# Patient Record
Sex: MALE | Race: WHITE | NOT HISPANIC OR LATINO | Employment: OTHER | URBAN - METROPOLITAN AREA
[De-identification: names, ages, dates, MRNs, and addresses within clinical notes are randomized per-mention and may not be internally consistent; named-entity substitution may affect disease eponyms.]

---

## 2018-08-07 ENCOUNTER — APPOINTMENT (OUTPATIENT)
Dept: RADIOLOGY | Facility: MEDICAL CENTER | Age: 68
End: 2018-08-07
Attending: EMERGENCY MEDICINE
Payer: MEDICARE

## 2018-08-07 ENCOUNTER — HOSPITAL ENCOUNTER (OUTPATIENT)
Facility: MEDICAL CENTER | Age: 68
End: 2018-08-08
Attending: EMERGENCY MEDICINE | Admitting: HOSPITALIST
Payer: MEDICARE

## 2018-08-07 DIAGNOSIS — R50.9 FEVER, UNSPECIFIED FEVER CAUSE: ICD-10-CM

## 2018-08-07 DIAGNOSIS — R09.02 HYPOXIA: ICD-10-CM

## 2018-08-07 PROBLEM — I48.20 CHRONIC ATRIAL FIBRILLATION (HCC): Status: ACTIVE | Noted: 2018-08-07

## 2018-08-07 PROBLEM — I10 ESSENTIAL HYPERTENSION: Status: ACTIVE | Noted: 2018-08-07

## 2018-08-07 LAB
ALBUMIN SERPL BCP-MCNC: 3.9 G/DL (ref 3.2–4.9)
ALBUMIN/GLOB SERPL: 1.2 G/DL
ALP SERPL-CCNC: 41 U/L (ref 30–99)
ALT SERPL-CCNC: 9 U/L (ref 2–50)
ANION GAP SERPL CALC-SCNC: 8 MMOL/L (ref 0–11.9)
APPEARANCE UR: CLEAR
APTT PPP: 29 SEC (ref 24.7–36)
AST SERPL-CCNC: 12 U/L (ref 12–45)
BACTERIA #/AREA URNS HPF: NEGATIVE /HPF
BASOPHILS # BLD AUTO: 0.2 % (ref 0–1.8)
BASOPHILS # BLD: 0.02 K/UL (ref 0–0.12)
BILIRUB SERPL-MCNC: 0.7 MG/DL (ref 0.1–1.5)
BILIRUB UR QL STRIP.AUTO: NEGATIVE
BNP SERPL-MCNC: 130 PG/ML (ref 0–100)
BUN SERPL-MCNC: 14 MG/DL (ref 8–22)
CALCIUM SERPL-MCNC: 8.6 MG/DL (ref 8.5–10.5)
CHLORIDE SERPL-SCNC: 100 MMOL/L (ref 96–112)
CO2 SERPL-SCNC: 25 MMOL/L (ref 20–33)
COLOR UR: ABNORMAL
CREAT SERPL-MCNC: 1.09 MG/DL (ref 0.5–1.4)
DEPRECATED D DIMER PPP IA-ACNC: 408 NG/ML(D-DU)
EKG IMPRESSION: NORMAL
EOSINOPHIL # BLD AUTO: 0.01 K/UL (ref 0–0.51)
EOSINOPHIL NFR BLD: 0.1 % (ref 0–6.9)
EPI CELLS #/AREA URNS HPF: NEGATIVE /HPF
ERYTHROCYTE [DISTWIDTH] IN BLOOD BY AUTOMATED COUNT: 48.9 FL (ref 35.9–50)
FLUAV RNA SPEC QL NAA+PROBE: NEGATIVE
FLUBV RNA SPEC QL NAA+PROBE: NEGATIVE
GLOBULIN SER CALC-MCNC: 3.3 G/DL (ref 1.9–3.5)
GLUCOSE SERPL-MCNC: 128 MG/DL (ref 65–99)
GLUCOSE UR STRIP.AUTO-MCNC: NEGATIVE MG/DL
HCT VFR BLD AUTO: 37.8 % (ref 42–52)
HGB BLD-MCNC: 12.4 G/DL (ref 14–18)
HYALINE CASTS #/AREA URNS LPF: ABNORMAL /LPF
IMM GRANULOCYTES # BLD AUTO: 0.05 K/UL (ref 0–0.11)
IMM GRANULOCYTES NFR BLD AUTO: 0.5 % (ref 0–0.9)
INR PPP: 1.32 (ref 0.87–1.13)
KETONES UR STRIP.AUTO-MCNC: NEGATIVE MG/DL
LACTATE BLD-SCNC: 1 MMOL/L (ref 0.5–2)
LACTATE BLD-SCNC: 1.1 MMOL/L (ref 0.5–2)
LACTATE BLD-SCNC: 1.3 MMOL/L (ref 0.5–2)
LACTATE BLD-SCNC: 1.5 MMOL/L (ref 0.5–2)
LEUKOCYTE ESTERASE UR QL STRIP.AUTO: NEGATIVE
LYMPHOCYTES # BLD AUTO: 1.31 K/UL (ref 1–4.8)
LYMPHOCYTES NFR BLD: 13.8 % (ref 22–41)
MCH RBC QN AUTO: 31.2 PG (ref 27–33)
MCHC RBC AUTO-ENTMCNC: 32.8 G/DL (ref 33.7–35.3)
MCV RBC AUTO: 95 FL (ref 81.4–97.8)
MICRO URNS: ABNORMAL
MONOCYTES # BLD AUTO: 0.73 K/UL (ref 0–0.85)
MONOCYTES NFR BLD AUTO: 7.7 % (ref 0–13.4)
NEUTROPHILS # BLD AUTO: 7.39 K/UL (ref 1.82–7.42)
NEUTROPHILS NFR BLD: 77.7 % (ref 44–72)
NITRITE UR QL STRIP.AUTO: NEGATIVE
NRBC # BLD AUTO: 0 K/UL
NRBC BLD-RTO: 0 /100 WBC
PH UR STRIP.AUTO: 6 [PH]
PLATELET # BLD AUTO: 150 K/UL (ref 164–446)
PMV BLD AUTO: 9.9 FL (ref 9–12.9)
POTASSIUM SERPL-SCNC: 4 MMOL/L (ref 3.6–5.5)
PROT SERPL-MCNC: 7.2 G/DL (ref 6–8.2)
PROT UR QL STRIP: 30 MG/DL
PROTHROMBIN TIME: 16.1 SEC (ref 12–14.6)
RBC # BLD AUTO: 3.98 M/UL (ref 4.7–6.1)
RBC # URNS HPF: ABNORMAL /HPF
RBC UR QL AUTO: ABNORMAL
SODIUM SERPL-SCNC: 133 MMOL/L (ref 135–145)
SP GR UR STRIP.AUTO: 1.03
TROPONIN I SERPL-MCNC: 0.03 NG/ML (ref 0–0.04)
UROBILINOGEN UR STRIP.AUTO-MCNC: 2 MG/DL
WBC # BLD AUTO: 9.5 K/UL (ref 4.8–10.8)
WBC #/AREA URNS HPF: ABNORMAL /HPF

## 2018-08-07 PROCEDURE — 87502 INFLUENZA DNA AMP PROBE: CPT

## 2018-08-07 PROCEDURE — 700111 HCHG RX REV CODE 636 W/ 250 OVERRIDE (IP): Performed by: HOSPITALIST

## 2018-08-07 PROCEDURE — 85610 PROTHROMBIN TIME: CPT

## 2018-08-07 PROCEDURE — 71275 CT ANGIOGRAPHY CHEST: CPT

## 2018-08-07 PROCEDURE — 700105 HCHG RX REV CODE 258: Performed by: ORTHOPAEDIC SURGERY

## 2018-08-07 PROCEDURE — 99291 CRITICAL CARE FIRST HOUR: CPT

## 2018-08-07 PROCEDURE — 93005 ELECTROCARDIOGRAM TRACING: CPT | Performed by: EMERGENCY MEDICINE

## 2018-08-07 PROCEDURE — 87040 BLOOD CULTURE FOR BACTERIA: CPT | Mod: 91

## 2018-08-07 PROCEDURE — 94640 AIRWAY INHALATION TREATMENT: CPT

## 2018-08-07 PROCEDURE — 85379 FIBRIN DEGRADATION QUANT: CPT

## 2018-08-07 PROCEDURE — 83605 ASSAY OF LACTIC ACID: CPT | Mod: 91

## 2018-08-07 PROCEDURE — 700102 HCHG RX REV CODE 250 W/ 637 OVERRIDE(OP): Performed by: HOSPITALIST

## 2018-08-07 PROCEDURE — 700102 HCHG RX REV CODE 250 W/ 637 OVERRIDE(OP): Performed by: ANESTHESIOLOGY

## 2018-08-07 PROCEDURE — 80053 COMPREHEN METABOLIC PANEL: CPT

## 2018-08-07 PROCEDURE — 700105 HCHG RX REV CODE 258: Performed by: EMERGENCY MEDICINE

## 2018-08-07 PROCEDURE — G0378 HOSPITAL OBSERVATION PER HR: HCPCS

## 2018-08-07 PROCEDURE — 700102 HCHG RX REV CODE 250 W/ 637 OVERRIDE(OP)

## 2018-08-07 PROCEDURE — 83880 ASSAY OF NATRIURETIC PEPTIDE: CPT

## 2018-08-07 PROCEDURE — 700117 HCHG RX CONTRAST REV CODE 255: Performed by: EMERGENCY MEDICINE

## 2018-08-07 PROCEDURE — 85025 COMPLETE CBC W/AUTO DIFF WBC: CPT

## 2018-08-07 PROCEDURE — 302128 INFUSION PUMP W/POLE: Performed by: HOSPITALIST

## 2018-08-07 PROCEDURE — A9270 NON-COVERED ITEM OR SERVICE: HCPCS | Performed by: ANESTHESIOLOGY

## 2018-08-07 PROCEDURE — 71045 X-RAY EXAM CHEST 1 VIEW: CPT

## 2018-08-07 PROCEDURE — 99220 PR INITIAL OBSERVATION CARE,LEVL III: CPT | Mod: AI | Performed by: HOSPITALIST

## 2018-08-07 PROCEDURE — 700101 HCHG RX REV CODE 250: Performed by: EMERGENCY MEDICINE

## 2018-08-07 PROCEDURE — 36415 COLL VENOUS BLD VENIPUNCTURE: CPT

## 2018-08-07 PROCEDURE — A9270 NON-COVERED ITEM OR SERVICE: HCPCS

## 2018-08-07 PROCEDURE — 96368 THER/DIAG CONCURRENT INF: CPT

## 2018-08-07 PROCEDURE — A9270 NON-COVERED ITEM OR SERVICE: HCPCS | Performed by: HOSPITALIST

## 2018-08-07 PROCEDURE — 700111 HCHG RX REV CODE 636 W/ 250 OVERRIDE (IP): Performed by: EMERGENCY MEDICINE

## 2018-08-07 PROCEDURE — 96365 THER/PROPH/DIAG IV INF INIT: CPT | Mod: XU

## 2018-08-07 PROCEDURE — 700111 HCHG RX REV CODE 636 W/ 250 OVERRIDE (IP): Performed by: ORTHOPAEDIC SURGERY

## 2018-08-07 PROCEDURE — 81001 URINALYSIS AUTO W/SCOPE: CPT

## 2018-08-07 PROCEDURE — 84484 ASSAY OF TROPONIN QUANT: CPT

## 2018-08-07 PROCEDURE — 85730 THROMBOPLASTIN TIME PARTIAL: CPT

## 2018-08-07 PROCEDURE — 73501 X-RAY EXAM HIP UNI 1 VIEW: CPT | Mod: RT

## 2018-08-07 RX ORDER — PREDNISONE 20 MG/1
40 TABLET ORAL DAILY
Status: DISCONTINUED | OUTPATIENT
Start: 2018-08-07 | End: 2018-08-08 | Stop reason: HOSPADM

## 2018-08-07 RX ORDER — ACETAMINOPHEN 325 MG/1
TABLET ORAL
Status: DISCONTINUED
Start: 2018-08-07 | End: 2018-08-07

## 2018-08-07 RX ORDER — LABETALOL HYDROCHLORIDE 5 MG/ML
10 INJECTION, SOLUTION INTRAVENOUS EVERY 4 HOURS PRN
Status: DISCONTINUED | OUTPATIENT
Start: 2018-08-07 | End: 2018-08-08 | Stop reason: HOSPADM

## 2018-08-07 RX ORDER — CELECOXIB 200 MG/1
400 CAPSULE ORAL ONCE
Status: DISCONTINUED | OUTPATIENT
Start: 2018-08-29 | End: 2018-08-07

## 2018-08-07 RX ORDER — OXYCODONE HCL 20 MG/1
20 TABLET, FILM COATED, EXTENDED RELEASE ORAL
Status: DISCONTINUED | OUTPATIENT
Start: 2018-08-29 | End: 2018-08-07

## 2018-08-07 RX ORDER — FAMOTIDINE 20 MG/1
20 TABLET, FILM COATED ORAL
Status: DISCONTINUED | OUTPATIENT
Start: 2018-08-29 | End: 2018-08-07

## 2018-08-07 RX ORDER — CEFAZOLIN SODIUM 2 G/100ML
2 INJECTION, SOLUTION INTRAVENOUS
Status: DISCONTINUED | OUTPATIENT
Start: 2018-08-29 | End: 2018-08-07

## 2018-08-07 RX ORDER — GABAPENTIN 300 MG/1
300 CAPSULE ORAL ONCE
Status: DISCONTINUED | OUTPATIENT
Start: 2018-08-29 | End: 2018-08-07

## 2018-08-07 RX ORDER — ONDANSETRON 2 MG/ML
4 INJECTION INTRAMUSCULAR; INTRAVENOUS EVERY 4 HOURS PRN
Status: DISCONTINUED | OUTPATIENT
Start: 2018-08-07 | End: 2018-08-08 | Stop reason: HOSPADM

## 2018-08-07 RX ORDER — SIMVASTATIN 40 MG
40 TABLET ORAL NIGHTLY
Status: DISCONTINUED | OUTPATIENT
Start: 2018-08-07 | End: 2018-08-08 | Stop reason: HOSPADM

## 2018-08-07 RX ORDER — CARVEDILOL 12.5 MG/1
12.5 TABLET ORAL 2 TIMES DAILY
Status: DISCONTINUED | OUTPATIENT
Start: 2018-08-07 | End: 2018-08-08 | Stop reason: HOSPADM

## 2018-08-07 RX ORDER — ONDANSETRON 4 MG/1
4 TABLET, ORALLY DISINTEGRATING ORAL EVERY 4 HOURS PRN
Status: DISCONTINUED | OUTPATIENT
Start: 2018-08-07 | End: 2018-08-08 | Stop reason: HOSPADM

## 2018-08-07 RX ORDER — AZITHROMYCIN 500 MG/1
500 INJECTION, POWDER, LYOPHILIZED, FOR SOLUTION INTRAVENOUS ONCE
Status: COMPLETED | OUTPATIENT
Start: 2018-08-07 | End: 2018-08-07

## 2018-08-07 RX ORDER — AMOXICILLIN 250 MG
CAPSULE ORAL
Status: COMPLETED
Start: 2018-08-07 | End: 2018-08-07

## 2018-08-07 RX ORDER — AMOXICILLIN 500 MG/1
1000 CAPSULE ORAL ONCE
Status: ON HOLD | COMMUNITY
End: 2018-08-08

## 2018-08-07 RX ORDER — LISINOPRIL 2.5 MG/1
2.5 TABLET ORAL EVERY MORNING
Status: DISCONTINUED | OUTPATIENT
Start: 2018-08-07 | End: 2018-08-08 | Stop reason: HOSPADM

## 2018-08-07 RX ORDER — ALBUTEROL SULFATE 90 UG/1
2 AEROSOL, METERED RESPIRATORY (INHALATION) EVERY 4 HOURS PRN
Status: DISCONTINUED | OUTPATIENT
Start: 2018-08-07 | End: 2018-08-08 | Stop reason: HOSPADM

## 2018-08-07 RX ORDER — POLYETHYLENE GLYCOL 3350 17 G/17G
1 POWDER, FOR SOLUTION ORAL
Status: DISCONTINUED | OUTPATIENT
Start: 2018-08-07 | End: 2018-08-08 | Stop reason: HOSPADM

## 2018-08-07 RX ORDER — ACETAMINOPHEN 500 MG
1000 TABLET ORAL ONCE
Status: DISCONTINUED | OUTPATIENT
Start: 2018-08-29 | End: 2018-08-07

## 2018-08-07 RX ORDER — BISACODYL 10 MG
10 SUPPOSITORY, RECTAL RECTAL
Status: DISCONTINUED | OUTPATIENT
Start: 2018-08-07 | End: 2018-08-08 | Stop reason: HOSPADM

## 2018-08-07 RX ORDER — ACETAMINOPHEN 325 MG/1
650 TABLET ORAL EVERY 6 HOURS PRN
Status: DISCONTINUED | OUTPATIENT
Start: 2018-08-07 | End: 2018-08-08 | Stop reason: HOSPADM

## 2018-08-07 RX ORDER — IPRATROPIUM BROMIDE AND ALBUTEROL SULFATE 2.5; .5 MG/3ML; MG/3ML
3 SOLUTION RESPIRATORY (INHALATION)
Status: COMPLETED | OUTPATIENT
Start: 2018-08-07 | End: 2018-08-07

## 2018-08-07 RX ORDER — AMOXICILLIN 250 MG
2 CAPSULE ORAL 2 TIMES DAILY
Status: DISCONTINUED | OUTPATIENT
Start: 2018-08-07 | End: 2018-08-08 | Stop reason: HOSPADM

## 2018-08-07 RX ADMIN — SIMVASTATIN 40 MG: 40 TABLET, FILM COATED ORAL at 20:50

## 2018-08-07 RX ADMIN — SODIUM CHLORIDE 3 G: 900 INJECTION INTRAVENOUS at 07:30

## 2018-08-07 RX ADMIN — PREDNISONE 40 MG: 20 TABLET ORAL at 12:05

## 2018-08-07 RX ADMIN — IOHEXOL 100 ML: 350 INJECTION, SOLUTION INTRAVENOUS at 09:40

## 2018-08-07 RX ADMIN — CARVEDILOL 12.5 MG: 12.5 TABLET, FILM COATED ORAL at 18:31

## 2018-08-07 RX ADMIN — APIXABAN 5 MG: 5 TABLET, FILM COATED ORAL at 18:31

## 2018-08-07 RX ADMIN — ACETAMINOPHEN 650 MG: 325 TABLET ORAL at 16:24

## 2018-08-07 RX ADMIN — AZITHROMYCIN FOR INJECTION INJECTION, POWDER, LYOPHILIZED, FOR SOLUTION 500 MG: 500 INJECTION INTRAVENOUS at 07:30

## 2018-08-07 RX ADMIN — Medication 1 TABLET: at 20:49

## 2018-08-07 RX ADMIN — IPRATROPIUM BROMIDE AND ALBUTEROL SULFATE 3 ML: .5; 3 SOLUTION RESPIRATORY (INHALATION) at 10:24

## 2018-08-07 RX ADMIN — STANDARDIZED SENNA CONCENTRATE AND DOCUSATE SODIUM 1 TABLET: 8.6; 5 TABLET, FILM COATED ORAL at 20:49

## 2018-08-07 ASSESSMENT — COPD QUESTIONNAIRES
DURING THE PAST 4 WEEKS HOW MUCH DID YOU FEEL SHORT OF BREATH: SOME OF THE TIME
DURING THE PAST 4 WEEKS HOW MUCH DID YOU FEEL SHORT OF BREATH: NONE/LITTLE OF THE TIME
DO YOU EVER COUGH UP ANY MUCUS OR PHLEGM?: NO/ONLY WITH OCCASIONAL COLDS OR INFECTIONS
IN THE PAST 12 MONTHS DO YOU DO LESS THAN YOU USED TO BECAUSE OF YOUR BREATHING PROBLEMS: DISAGREE/UNSURE
DO YOU EVER COUGH UP ANY MUCUS OR PHLEGM?: NO/ONLY WITH OCCASIONAL COLDS OR INFECTIONS
HAVE YOU SMOKED AT LEAST 100 CIGARETTES IN YOUR ENTIRE LIFE: NO/DON'T KNOW
COPD SCREENING SCORE: 5
HAVE YOU SMOKED AT LEAST 100 CIGARETTES IN YOUR ENTIRE LIFE: YES
COPD SCREENING SCORE: 2

## 2018-08-07 ASSESSMENT — ENCOUNTER SYMPTOMS
DIZZINESS: 0
MYALGIAS: 0
CHILLS: 1
CONSTIPATION: 0
COUGH: 1
DIARRHEA: 0
HEADACHES: 0
FOCAL WEAKNESS: 0
SHORTNESS OF BREATH: 1
NAUSEA: 0
PALPITATIONS: 0
WEAKNESS: 0
FEVER: 0
VOMITING: 0
ABDOMINAL PAIN: 0

## 2018-08-07 ASSESSMENT — PATIENT HEALTH QUESTIONNAIRE - PHQ9
1. LITTLE INTEREST OR PLEASURE IN DOING THINGS: NOT AT ALL
2. FEELING DOWN, DEPRESSED, IRRITABLE, OR HOPELESS: NOT AT ALL
SUM OF ALL RESPONSES TO PHQ9 QUESTIONS 1 AND 2: 0

## 2018-08-07 ASSESSMENT — LIFESTYLE VARIABLES
EVER_SMOKED: YES
EVER_SMOKED: YES
ALCOHOL_USE: NO

## 2018-08-07 ASSESSMENT — PAIN SCALES - GENERAL
PAINLEVEL_OUTOF10: 4
PAINLEVEL_OUTOF10: 7
PAINLEVEL_OUTOF10: 5
PAINLEVEL_OUTOF10: 3
PAINLEVEL_OUTOF10: 7

## 2018-08-07 NOTE — ED NOTES
Updated patient on plan of care, aware we are still waiting for bed. Patient resting in bed. Side rails up. Call light in reach. No needs expressed. No questions. Vital signs stable. Wife at bedside.

## 2018-08-07 NOTE — ED TRIAGE NOTES
"Jaspreet SYKES Dobler  68 y.o. Male    Chief Complaint   Patient presents with   • T-5000 GLF     Pt reports he fell this morning at home. Denies LOC. States he tripped.    • Fever     Pt reports having a 103f fever at home, EMS reports 100f   • Shortness of Breath     Pt has felt SOB upon exertion for the past 2 weeks     Pt BIB EMS for above CC.   EMS placed PIV and administered 400cc NS during ride.   Upon arrival to ER. Pt is alert and oriented, speaking in full sentences, follows commands and responds appropriately to questions. NAD. Resp are meera and unlabored.     Hx: CHF    Blood Pressure : 119/69, Pulse: 81, Respiration: 16, Temperature: 37.6 °C (99.7 °F), Height: 193 cm (6' 4\"), Weight: 98.9 kg (218 lb), BMI (Calculated): 26.54, BSA (Calculated): 2.3, Pulse Oximetry: 94 %, O2 (LPM): 2, O2 Delivery: Nasal Cannula    "

## 2018-08-07 NOTE — H&P
"Hospital Medicine History & Physical Note    Date of Service  8/7/2018    Primary Care Physician  Anthony Cason M.D.    Consultants  None    Code Status  Full    Chief Complaint  \"I can't shake this cough\"    History of Presenting Illness  68 y.o. male who presented 8/7/2018 with shortness of breath and cough after a cold that started on the flight back from a three week trip to [place]. He did not have any animal contact or any farmland visits. He was not hiking. He had no sick contacts. He has atrial fibrillation, which is rate controlled, and he is on Eliquis. He feels like he has some shaking chills but has not taken his temperature. His cough is not really improved per the patient.      Review of Systems  Review of Systems   Constitutional: Positive for chills. Negative for fever.   Respiratory: Positive for cough and shortness of breath.    Cardiovascular: Negative for chest pain and palpitations.   Gastrointestinal: Negative for abdominal pain, constipation, diarrhea, nausea and vomiting.   Genitourinary: Negative for dysuria.   Musculoskeletal: Negative for myalgias.   Skin: Negative for itching.   Neurological: Negative for dizziness, focal weakness, weakness and headaches.   All other systems reviewed and are negative.      Past Medical History   has a past medical history of Acute pain; Allergy; Arthritis; Asthma; Cancer (HCC) (y-12); Chronic atrial fibrillation (HCC) (8/7/2018); Congestive heart failure (HCC); Essential hypertension (8/7/2018); High cholesterol; Hyperlipidemia; and Osteoporosis. He also has no past medical history of Anesthesia; Anginal syndrome ([facility]); At risk for sleep apnea; Blood clotting disorder (HCC); Bowel habit changes; Breath shortness; Bronchitis; CAPD (continuous ambulatory peritoneal dialysis) status; Carcinoma in situ of respiratory system; Cataract; Cold; Coughing blood; Dental disorder; Diabetes (HCC); Disorder of thyroid; Emphysema of lung (HCC); Glaucoma; Heart " burn; Heart murmur; Heart valve disease; Hemorrhagic disorder (HCC); Hepatitis A; Hepatitis B; Hepatitis C; Indigestion; Myocardial infarct (HCC); Pacemaker; Pneumonia; Psychiatric problem; Renal disorder; Rheumatic fever; Seizure (HCC); Sleep apnea; Snoring; Stroke (HCC); Tuberculosis; Urinary bladder disorder; or Urinary incontinence.    Surgical History   has a past surgical history that includes hip arthroplasty total; knee arthroplasty total (Left); other abdominal surgery; and hip arth anterior total (Left, 8/10/2016).     Family History  family history includes Cancer in his father, sister, and sister; GI in his sister; Seizures in his sister.     Social History   reports that he quit smoking about 28 years ago. He has never used smokeless tobacco. He reports that he does not drink alcohol or use drugs. He smoked for 20 years.    Allergies  No Known Allergies    Medications  Prior to Admission Medications   Prescriptions Last Dose Informant Patient Reported? Taking?   acetaminophen (TYLENOL) 500 MG Tab 8/7/2018 at 0400 Patient Yes No   Sig: Take 500 mg by mouth 4 times a day as needed.   amoxicillin (AMOXIL) 500 MG Cap > 2 weeks at ONCE Patient's Home Pharmacy Yes Yes   Sig: Take 1,000 mg by mouth Once. Pt completed a one time dose of AMOXIL > 2 weeks ago for dental procedure.   apixaban (ELIQUIS) 5mg Tab 8/7/2018 at 0400 Patient Yes No   Sig: Take 5 mg by mouth 2 Times a Day.   carvedilol (COREG) 12.5 MG Tab 8/7/2018 at 0400 Patient Yes No   Sig: Take 12.5 mg by mouth 2 Times a Day.   lisinopril (PRINIVIL) 5 MG Tab 8/7/2018 at 0400 Patient Yes No   Sig: Take 2.5 mg by mouth every morning.   sennosides (SENOKOT) 8.6 MG Tab > 3 days at PRN Patient Yes No   Sig: Take 8.6 mg by mouth 1 time daily as needed.   simvastatin (ZOCOR) 40 MG Tab 8/6/2018 at 2000 Patient Yes No   Sig: Take 40 mg by mouth every evening.   zolpidem (AMBIEN) 5 MG Tab 8/6/2018 at 2000 Patient Yes No   Sig: Take 5 mg by mouth at bedtime as  needed for Sleep.      Facility-Administered Medications: None       Physical Exam  Blood Pressure : 119/69   Temperature: 37.6 °C (99.7 °F)   Pulse: 81   Respiration: 16   Pulse Oximetry: 97 %     Physical Exam   Constitutional: He is oriented to person, place, and time. He appears well-developed and well-nourished.   HENT:   Head: Normocephalic and atraumatic.   Mouth/Throat: Oropharynx is clear and moist.   Eyes: Pupils are equal, round, and reactive to light. Conjunctivae and EOM are normal. No scleral icterus.   Neck: Normal range of motion. Neck supple. No tracheal deviation present. No thyromegaly present.   Cardiovascular: Normal rate, regular rhythm, normal heart sounds and intact distal pulses.    No murmur heard.  Pulmonary/Chest: Effort normal and breath sounds normal. No respiratory distress. He has no wheezes. He has no rales. He exhibits no tenderness.   Abdominal: Soft. Bowel sounds are normal. He exhibits no distension. There is no tenderness.   Musculoskeletal: Normal range of motion. He exhibits no edema or tenderness.   Lymphadenopathy:     He has no cervical adenopathy.        Right: No supraclavicular adenopathy present.        Left: No supraclavicular adenopathy present.   Neurological: He is alert and oriented to person, place, and time. No cranial nerve deficit.   Skin: Skin is warm and dry.   Vitals reviewed.      Laboratory:  Recent Labs      08/07/18 0710   WBC  9.5   RBC  3.98*   HEMOGLOBIN  12.4*   HEMATOCRIT  37.8*   MCV  95.0   MCH  31.2   MCHC  32.8*   RDW  48.9   PLATELETCT  150*   MPV  9.9     Recent Labs      08/07/18   0710   SODIUM  133*   POTASSIUM  4.0   CHLORIDE  100   CO2  25   GLUCOSE  128*   BUN  14   CREATININE  1.09   CALCIUM  8.6     Recent Labs      08/07/18   0710   ALTSGPT  9   ASTSGOT  12   ALKPHOSPHAT  41   TBILIRUBIN  0.7   GLUCOSE  128*     Recent Labs      08/07/18 0710   APTT  29.0   INR  1.32*     Recent Labs      08/07/18 0710   BNPBTYPENAT  130*          Lab Results   Component Value Date    TROPONINI 0.03 08/07/2018       Urinalysis:    Recent Labs      08/07/18   0832   SPECGRAVITY  1.026   GLUCOSEUR  Negative   KETONES  Negative   NITRITE  Negative   LEUKESTERAS  Negative   WBCURINE  0-2*   RBCURINE  5-10*   BACTERIA  Negative   EPITHELCELL  Negative        Imaging:  DX-HIP-UNILATERAL-WITH PELVIS-1 VIEW RIGHT   Final Result      Postsurgical changes of the bilateral hips without definite acute osseous abnormality or evidence of hardware failure or complication.      CT-CTA CHEST PULMONARY ARTERY W/ RECONS   Final Result      1.  No evidence of pulmonary embolism.   2.  Basilar dependent atelectasis or scarring.   3.  Cholelithiasis.            DX-CHEST-PORTABLE (1 VIEW)   Final Result      Mild lung base atelectasis. Mild interstitial edema or pneumonitis not excluded.      I reviewed the images myself and agree with the radiologist's findings.      Assessment/Plan:  I anticipate this patient is appropriate for observation status at this time.    * Hypoxemia- (present on admission)   Assessment & Plan    I suspect this is due to reactive airway disease after a recent cold  He is hypoxemic still  I will start him on steroids for five days and RT while in the hospital  He should stick around overnight until his oxygen saturations are in the safe range and he can exert himself without dropping  PE is ruled out  No pneumonia on exam or imaging  Doubt alternatives like pulm artery hypertension but can't exclude it--if not improved can get echo        Chronic atrial fibrillation (HCC)- (present on admission)   Assessment & Plan    Will continue BB and eliquis  Rate controlled  No acute intervention        Essential hypertension- (present on admission)   Assessment & Plan    SBP goal less than 140 mmHg  DBP goal less than 90 mmHg  PRN and antihypertensives to titrate by hospitalist towards goal  Most recent Blood Pressure : 119/69             VTE prophylaxis: his home  eliquis

## 2018-08-07 NOTE — ED NOTES
Provided snack box, juice and milk at patient request. MD states patient okay to eat/drink. Updated patient on plan of care. Patient resting in bed. Side rails up. Call light in reach. No needs expressed. No questions. Vital signs stable.

## 2018-08-07 NOTE — ASSESSMENT & PLAN NOTE
- most likely due to reactive airway disease from recent viral URI  - was started on O2 supplementation and weaned to RA currently  - cont nebs tx per RT protocol and PO steroids to complete 5d course  - no clinical or radiographic e/o PNA noted; holding abx  - cont monitoring

## 2018-08-07 NOTE — FLOWSHEET NOTE
08/07/18 1026   Events/Summary/Plan   Events/Summary/Plan SVN given   Interdisciplinary Plan of Care-Goals (Indications)   Bronchodilator Indications Strong Subjective / Objective Improvement   Interdisciplinary Plan of Care-Outcomes    Bronchodilator Outcome Improved Vital Signs and Measures of Gas Exchange   Education   Education Yes - Pt. / Family has been Instructed in use of Respiratory Equipment;Yes - Pt. / Family has been Instructed in use of Respiratory Medications and Adverse Reactions   SVN Group   #SVN Performed Yes   Given By: Mouthpiece   Date SVN Last Changed 08/07/18   Date SVN Next Change Due (Q 7 Days) 08/14/18   Respiratory WDL   Respiratory (WDL) X   Chest Exam   Work Of Breathing / Effort Mild   Respiration 16   Pulse 75   Heart Rate (Monitored) 75   Breath Sounds   Pre/Post Intervention Pre Intervention Assessment   RUL Breath Sounds Diminished   RML Breath Sounds Diminished   RLL Breath Sounds Diminished   MARK Breath Sounds Diminished   LLL Breath Sounds Diminished   Oximetry   Continuous Oximetry Yes   Oxygen   Pulse Oximetry 90 %   O2 (LPM) 2.5   O2 Daily Delivery Respiratory  Silicone Nasal Cannula

## 2018-08-07 NOTE — ED NOTES
Patient prompted for urine sample at this time. Urinal in reach. Updated patient on plan of care. Patient resting in bed. Side rails up. Call light in reach. No needs expressed. No questions. Vital signs stable. Wife at bedside

## 2018-08-07 NOTE — PROGRESS NOTES
Received bedside report from ER RN. Assumed care of patient. Pt assessed and stable. VSS. Patient reports 5/10 pain at this time.  Administered medication for pain.  Discussed plan of care for day with patient and received verbal understanding. Call light within reach, bed in low position.  Educated pt re fall precautions and received verbal understanding.  However, pt continues to refuse bed alarms.  Pt is alert, oriented, and calls appropriately. Pt's temporal temperature was 100.5F on arrival.  Administered Tylenol.

## 2018-08-07 NOTE — ED NOTES
PIV established. Blood cultures x2 and rainbow of labs collected and tubed to lab.   Report to Wisam SALAS.

## 2018-08-07 NOTE — ED NOTES
"Patient becoming agitated, states \"we need to get the show on the road, we have stuff to do today.\" RN notified patient he is next for CT scan. Verbalized understanding. RN Spoke with MD, states he will speak with patient after CT.   "

## 2018-08-07 NOTE — ED NOTES
Patient resting in bed. Side rails up. Call light in reach. No needs expressed. No questions. Vital signs stable.

## 2018-08-07 NOTE — ED NOTES
Med Rec complete per Pt at bedside and Noricristinos @ [phone]  Allergies Reviewed    Pt completed a one time dose of AMOXIL > 2 weeks ago for dental procedure.

## 2018-08-07 NOTE — ED NOTES
Inpatient hospital bed provided. Updated patient on plan of care. Patient resting in bed. Side rails up. Call light in reach. No needs expressed. No questions. Vital signs stable. Patient appears to be in pain due to fall onto left hip, limping with ambulation. RN offered pain medication, patient declined.

## 2018-08-07 NOTE — ED PROVIDER NOTES
ED Provider Note    CHIEF COMPLAINT  Chief Complaint   Patient presents with   • T-5000 GLF     Pt reports he fell this morning at home. Denies LOC. States he tripped.    • Fever     Pt reports having a 103f fever at home, EMS reports 100f   • Shortness of Breath     Pt has felt SOB upon exertion for the past 2 weeks       HPI  Jaspreet Moon is a 68 y.o. male who presents with a chief complaint of shortness of breath.  Patient reports he has been short of breath with exertion for the last 2 weeks since getting home from a trip to [place].  He has had a cough which is moist but nonproductive.  He has felt hot, but developed fever up to 103 at home over the last 2 days.  He is felt very weak with this and this morning he slumped down to the ground because of his weakness.  He denies head injury or trauma related to this fall.  He has not had any chest pain or pleuritic pain.  Denies leg swelling or leg pain.  No rash.  Denies dysuria hematuria frequency urgency.  He has not had any orthopnea or PND.    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;  Service:    • HIP ARTHROPLASTY TOTAL      r hip   • KNEE ARTHROPLASTY TOTAL Left    • OTHER ABDOMINAL SURGERY      colon resection       CURRENT MEDICATIONS  Home Medications      "Reviewed by Elías Newman R.N. (Registered Nurse) on 08/07/18 at 0656  Med List Status: Partial   Medication Last Dose Status   acetaminophen (TYLENOL) 500 MG Tab 8/9/2016 Active   apixaban (ELIQUIS) 5mg Tab  Active   carvedilol (COREG) 12.5 MG Tab  Active   docusate sodium 100 MG Cap  Active   lisinopril (PRINIVIL) 5 MG Tab  Active   sennosides (SENOKOT) 8.6 MG Tab 8/4/2016 Active   simvastatin (ZOCOR) 40 MG Tab 8/9/2016 Active   zolpidem (AMBIEN) 5 MG Tab 8/9/2016 Active                ALLERGIES  No Known Allergies    PHYSICAL EXAM  VITAL SIGNS: /69   Pulse 73   Temp 37.6 °C (99.7 °F)   Resp 16   Ht 1.93 m (6' 4\")   Wt 98.9 kg (218 lb)   SpO2 94%   BMI 26.54 kg/m²    Constitutional: Awake and alert.  Somewhat breathless when speaking  HENT:  Atraumatic, Normocephalic.Oropharynx mildly mucus membranes, Nose normal inspection.   Eyes: Normal inspection  Neck: Supple  Cardiovascular: Normal heart rate, Normal rhythm.  Symmetric peripheral pulses.   Thorax & Lungs: No respiratory distress, No wheezing, No rales, No rhonchi, No chest tenderness.   Abdomen: Bowel sounds normal, soft, non-distended, nontender, no mass  Skin: Warm, Dry, No rash.   Back: No tenderness, No CVA tenderness.   Extremities: Audible venous varicosities.  No asymmetric swelling.   Neurologic: Grossly normal   Psychiatric: Anxious appearing    RADIOLOGY/PROCEDURES  DX-HIP-UNILATERAL-WITH PELVIS-1 VIEW RIGHT   Final Result      Postsurgical changes of the bilateral hips without definite acute osseous abnormality or evidence of hardware failure or complication.      CT-CTA CHEST PULMONARY ARTERY W/ RECONS   Final Result      1.  No evidence of pulmonary embolism.   2.  Basilar dependent atelectasis or scarring.   3.  Cholelithiasis.            DX-CHEST-PORTABLE (1 VIEW)   Final Result      Mild lung base atelectasis. Mild interstitial edema or pneumonitis not excluded.        Imaging is interpreted by radiologist    Labs:  Results " for orders placed or performed during the hospital encounter of 08/07/18   CBC w/ Differential   Result Value Ref Range    WBC 9.5 4.8 - 10.8 K/uL    RBC 3.98 (L) 4.70 - 6.10 M/uL    Hemoglobin 12.4 (L) 14.0 - 18.0 g/dL    Hematocrit 37.8 (L) 42.0 - 52.0 %    MCV 95.0 81.4 - 97.8 fL    MCH 31.2 27.0 - 33.0 pg    MCHC 32.8 (L) 33.7 - 35.3 g/dL    RDW 48.9 35.9 - 50.0 fL    Platelet Count 150 (L) 164 - 446 K/uL    MPV 9.9 9.0 - 12.9 fL    Neutrophils-Polys 77.70 (H) 44.00 - 72.00 %    Lymphocytes 13.80 (L) 22.00 - 41.00 %    Monocytes 7.70 0.00 - 13.40 %    Eosinophils 0.10 0.00 - 6.90 %    Basophils 0.20 0.00 - 1.80 %    Immature Granulocytes 0.50 0.00 - 0.90 %    Nucleated RBC 0.00 /100 WBC    Neutrophils (Absolute) 7.39 1.82 - 7.42 K/uL    Lymphs (Absolute) 1.31 1.00 - 4.80 K/uL    Monos (Absolute) 0.73 0.00 - 0.85 K/uL    Eos (Absolute) 0.01 0.00 - 0.51 K/uL    Baso (Absolute) 0.02 0.00 - 0.12 K/uL    Immature Granulocytes (abs) 0.05 0.00 - 0.11 K/uL    NRBC (Absolute) 0.00 K/uL   Complete Metabolic Panel (CMP)   Result Value Ref Range    Sodium 133 (L) 135 - 145 mmol/L    Potassium 4.0 3.6 - 5.5 mmol/L    Chloride 100 96 - 112 mmol/L    Co2 25 20 - 33 mmol/L    Anion Gap 8.0 0.0 - 11.9    Glucose 128 (H) 65 - 99 mg/dL    Bun 14 8 - 22 mg/dL    Creatinine 1.09 0.50 - 1.40 mg/dL    Calcium 8.6 8.5 - 10.5 mg/dL    AST(SGOT) 12 12 - 45 U/L    ALT(SGPT) 9 2 - 50 U/L    Alkaline Phosphatase 41 30 - 99 U/L    Total Bilirubin 0.7 0.1 - 1.5 mg/dL    Albumin 3.9 3.2 - 4.9 g/dL    Total Protein 7.2 6.0 - 8.2 g/dL    Globulin 3.3 1.9 - 3.5 g/dL    A-G Ratio 1.2 g/dL   Btype Natriuretic Peptide   Result Value Ref Range    B Natriuretic Peptide 130 (H) 0 - 100 pg/mL   Troponin STAT   Result Value Ref Range    Troponin I 0.03 0.00 - 0.04 ng/mL   LACTIC ACID   Result Value Ref Range    Lactic Acid 1.3 0.5 - 2.0 mmol/L   LACTIC ACID   Result Value Ref Range    Lactic Acid 1.0 0.5 - 2.0 mmol/L   PT/INR   Result Value Ref Range     PT 16.1 (H) 12.0 - 14.6 sec    INR 1.32 (H) 0.87 - 1.13   APTT   Result Value Ref Range    APTT 29.0 24.7 - 36.0 sec   D-Dimer (only helpful in low pre-test probability wells critieria. Do not order if patient ruled out by PERC criteria. See Weblinks at top of Labs section)   Result Value Ref Range    D-Dimer Screen 408 (H) <250 ng/mL(D-DU)   Influenza By PCR, A/B   Result Value Ref Range    Influenza virus A RNA Negative Negative    Influenza virus B, PCR Negative Negative   URINALYSIS,CULTURE IF INDICATED   Result Value Ref Range    Color DK Yellow     Character Clear     Specific Gravity 1.026 <1.035    Ph 6.0 5.0 - 8.0    Glucose Negative Negative mg/dL    Ketones Negative Negative mg/dL    Protein 30 (A) Negative mg/dL    Bilirubin Negative Negative    Urobilinogen, Urine 2.0 Negative    Nitrite Negative Negative    Leukocyte Esterase Negative Negative    Occult Blood Trace (A) Negative    Micro Urine Req Microscopic    ESTIMATED GFR   Result Value Ref Range    GFR If African American >60 >60 mL/min/1.73 m 2    GFR If Non African American >60 >60 mL/min/1.73 m 2   URINE MICROSCOPIC (W/UA)   Result Value Ref Range    WBC 0-2 (A) /hpf    RBC 5-10 (A) /hpf    Bacteria Negative None /hpf    Epithelial Cells Negative /hpf    Hyaline Cast 0-2 /lpf   EKG (ER)   Result Value Ref Range    Report       [facility] Emergency Dept.    Test Date:  2018  Pt Name:    MARIELOS BAEZ              Department: ER  MRN:        [id number]                      Room:        16  Gender:     Male                         Technician: [id number]  :        [id number]                   Requested By:LILO GONGORA  Order #:    [id number]                    Reading MD:    Measurements  Intervals                                Axis  Rate:       81                           P:          63  MI:         156                          QRS:        74  QRSD:       94                           T:          45  QT:          384  QTc:        446    Interpretive Statements  SINUS RHYTHM  PROBABLE LEFT ATRIAL ABNORMALITY  No previous ECG available for comparison          Medications   ampicillin/sulbactam (UNASYN) 3 g in  mL IVPB (0 g Intravenous Stopped 8/7/18 0800)   azithromycin (ZITHROMAX) injection 500 mg (500 mg Intravenous Given 8/7/18 0730)     COURSE & MEDICAL DECISION MAKING  Patient presents with dyspnea.  He is hypoxic on room air.  He did not have overt evidence of congestive heart failure or obvious findings on his exam.  He describes a recent trip overseas.  PE is within the differential.  Reports he has had fever and pneumonia is a possibility.  He is treated empirically for pneumonia with Unasyn and azithromycin.  Workup was undertaken.  His laboratory data returned relatively unremarkable.  Chest x-ray was clear.  He did have an elevated d-dimer and CT pulmonary angiogram was obtained which was unremarkable.  The source of his fevers was not identified.  He has had fairly recent dental work.  He could have endocarditis or other occult bacterial illness.  He remains hypoxic when he is taken off supplemental nasal cannula oxygen.  A trial of albuterol was ordered.  He will need to be admitted to the hospital for further workup and rule out bacteremia/endocarditis.  I consulted Dr. Peng for admission.    FINAL IMPRESSION  1.  Fever  2.  Hypoxia      This dictation was created using voice recognition software. The accuracy of the dictation is limited to the abilities of the software.  The nursing notes were reviewed and certain aspects of this information were incorporated into this note.      Electronically signed by: Wisam Baron, 8/7/2018 9:35 AM

## 2018-08-07 NOTE — ED NOTES
Attempted room air 02 sat, Patient dropped to mid to upper 80s (85-89), MD aware and at bedside to update patient. Placed back on 2L NC

## 2018-08-08 VITALS
SYSTOLIC BLOOD PRESSURE: 105 MMHG | TEMPERATURE: 98.8 F | HEIGHT: 76 IN | WEIGHT: 218 LBS | HEART RATE: 84 BPM | DIASTOLIC BLOOD PRESSURE: 68 MMHG | OXYGEN SATURATION: 91 % | BODY MASS INDEX: 26.55 KG/M2 | RESPIRATION RATE: 18 BRPM

## 2018-08-08 PROBLEM — J96.01 ACUTE RESPIRATORY FAILURE WITH HYPOXIA (HCC): Status: ACTIVE | Noted: 2018-08-07

## 2018-08-08 PROBLEM — J45.909 REACTIVE AIRWAY DISEASE: Status: ACTIVE | Noted: 2018-08-08

## 2018-08-08 PROCEDURE — A9270 NON-COVERED ITEM OR SERVICE: HCPCS | Performed by: INTERNAL MEDICINE

## 2018-08-08 PROCEDURE — 700102 HCHG RX REV CODE 250 W/ 637 OVERRIDE(OP): Performed by: HOSPITALIST

## 2018-08-08 PROCEDURE — 700102 HCHG RX REV CODE 250 W/ 637 OVERRIDE(OP): Performed by: INTERNAL MEDICINE

## 2018-08-08 PROCEDURE — A9270 NON-COVERED ITEM OR SERVICE: HCPCS | Performed by: HOSPITALIST

## 2018-08-08 PROCEDURE — A9270 NON-COVERED ITEM OR SERVICE: HCPCS

## 2018-08-08 PROCEDURE — 700102 HCHG RX REV CODE 250 W/ 637 OVERRIDE(OP)

## 2018-08-08 PROCEDURE — 700111 HCHG RX REV CODE 636 W/ 250 OVERRIDE (IP): Performed by: HOSPITALIST

## 2018-08-08 PROCEDURE — 99217 PR OBSERVATION CARE DISCHARGE: CPT | Performed by: INTERNAL MEDICINE

## 2018-08-08 RX ORDER — ALBUTEROL SULFATE 90 UG/1
2 AEROSOL, METERED RESPIRATORY (INHALATION) EVERY 4 HOURS PRN
Qty: 8.5 G | Refills: 0 | Status: SHIPPED | OUTPATIENT
Start: 2018-08-08

## 2018-08-08 RX ORDER — DOXYCYCLINE 100 MG/1
100 CAPSULE ORAL 2 TIMES DAILY
Qty: 10 CAP | Refills: 0 | Status: SHIPPED | OUTPATIENT
Start: 2018-08-08 | End: 2018-08-13

## 2018-08-08 RX ORDER — GUAIFENESIN 600 MG/1
600 TABLET, EXTENDED RELEASE ORAL EVERY 12 HOURS
Qty: 10 TAB | Refills: 0 | Status: SHIPPED | OUTPATIENT
Start: 2018-08-08 | End: 2018-08-13

## 2018-08-08 RX ORDER — LORATADINE 10 MG/1
10 TABLET ORAL DAILY
Qty: 10 TAB | Refills: 0 | Status: SHIPPED | OUTPATIENT
Start: 2018-08-08 | End: 2018-08-18

## 2018-08-08 RX ORDER — AMOXICILLIN 250 MG
CAPSULE ORAL
Status: DISCONTINUED
Start: 2018-08-08 | End: 2018-08-08 | Stop reason: HOSPADM

## 2018-08-08 RX ORDER — GUAIFENESIN 600 MG/1
600 TABLET, EXTENDED RELEASE ORAL EVERY 12 HOURS
Status: DISCONTINUED | OUTPATIENT
Start: 2018-08-08 | End: 2018-08-08 | Stop reason: HOSPADM

## 2018-08-08 RX ORDER — PREDNISONE 20 MG/1
40 TABLET ORAL DAILY
Qty: 10 TAB | Refills: 0 | Status: SHIPPED | OUTPATIENT
Start: 2018-08-08 | End: 2018-08-13

## 2018-08-08 RX ADMIN — APIXABAN 5 MG: 5 TABLET, FILM COATED ORAL at 05:45

## 2018-08-08 RX ADMIN — LISINOPRIL 2.5 MG: 2.5 TABLET ORAL at 05:45

## 2018-08-08 RX ADMIN — PREDNISONE 40 MG: 20 TABLET ORAL at 05:45

## 2018-08-08 RX ADMIN — ALBUTEROL SULFATE 2 PUFF: 90 AEROSOL, METERED RESPIRATORY (INHALATION) at 08:41

## 2018-08-08 RX ADMIN — CARVEDILOL 12.5 MG: 12.5 TABLET, FILM COATED ORAL at 05:45

## 2018-08-08 ASSESSMENT — PAIN SCALES - GENERAL
PAINLEVEL_OUTOF10: 2
PAINLEVEL_OUTOF10: 3

## 2018-08-08 NOTE — DISCHARGE INSTRUCTIONS
Discharge Instructions    Discharged to home by car with relative. Discharged via wheelchair, hospital escort: Yes.  Special equipment needed: Not Applicable    Be sure to schedule a follow-up appointment with your primary care doctor or any specialists as instructed.     Discharge Plan:   Diet Plan: Discussed  Activity Level: Discussed  Confirmed Follow up Appointment: Patient to Call and Schedule Appointment  Confirmed Symptoms Management: Discussed  Medication Reconciliation Updated: Yes  Influenza Vaccine Indication: Indicated: Not available from distributor/    I understand that a diet low in cholesterol, fat, and sodium is recommended for good health. Unless I have been given specific instructions below for another diet, I accept this instruction as my diet prescription.   Other diet: 2 gram sodium     Special Instructions: None    · Is patient discharged on Warfarin / Coumadin?   No     Depression / Suicide Risk    As you are discharged from this [facility] facility, it is important to learn how to keep safe from harming yourself.    Recognize the warning signs:  · Abrupt changes in personality, positive or negative- including increase in energy   · Giving away possessions  · Change in eating patterns- significant weight changes-  positive or negative  · Change in sleeping patterns- unable to sleep or sleeping all the time   · Unwillingness or inability to communicate  · Depression  · Unusual sadness, discouragement and loneliness  · Talk of wanting to die  · Neglect of personal appearance   · Rebelliousness- reckless behavior  · Withdrawal from people/activities they love  · Confusion- inability to concentrate     If you or a loved one observes any of these behaviors or has concerns about self-harm, here's what you can do:  · Talk about it- your feelings and reasons for harming yourself  · Remove any means that you might use to hurt yourself (examples: pills, rope, extension cords,  firearm)  · Get professional help from the community (Mental Health, Substance Abuse, psychological counseling)  · Do not be alone:Call your Safe Contact- someone whom you trust who will be there for you.  · Call your local CRISIS HOTLINE 454-1434 or 631-750-3293  · Call your local Children's Mobile Crisis Response Team [place] (965) 739-5669 or www.[employer] [place]  · Call the toll free National Suicide Prevention Hotlines   · National Suicide Prevention Lifeline 898-051-OBUB (2828)  · [employer] Hope Line Network 800-SUICIDE (850-7967)    Reactive airway disease  What are the causes?  Possible triggers are:  · Animal dander from the skin, hair, or feathers of animals.  · Dust mites contained in house dust.  · Cockroaches.  · Pollen from trees or grass.  · Mold.  · Cigarette or tobacco smoke.  · Air pollutants such as dust, household , hair sprays, aerosol sprays, paint fumes, strong chemicals, or strong odors.  · Cold air or weather changes. Cold air may trigger inflammation. Winds increase molds and pollens in the air.  · Strong emotions such as crying or laughing hard.  · Stress.  · Sulfites in foods and drinks, such as dried fruits and wine.  · Infections or inflammatory conditions, such as a flu, cold, or inflammation of the nasal membranes (rhinitis).  · Gastroesophageal reflux disease (GERD). GERD is a condition where stomach acid backs up into your esophagus.  · Exercise or strenuous activity.  What are the signs or symptoms?  · Wheezing.  · Excessive coughing, particularly at night.  · Chest tightness.  · Shortness of breath.  How is this diagnosed?  Your health care provider will ask you about your medical history and perform a physical exam. A chest X-ray or blood testing may be performed to look for other causes of your symptoms or other conditions that may have triggered your asthma attack.  How is this treated?  Treatment is aimed at reducing inflammation and opening up the airways in your  lungs.   Follow these instructions at home:  · Rest.  · Drink plenty of liquids. This helps the mucus to remain thin and be easily coughed up. Only use caffeine in moderation and do not use alcohol until you have recovered from your illness.  · Do not smoke. Avoid being exposed to secondhand smoke.  · You play a critical role in keeping yourself in good health. Avoid exposure to things that cause you to wheeze or to have breathing problems.  · Keep your medicines up-to-date and available. Carefully follow your health care provider’s treatment plan.  · Take your medicine exactly as prescribed.  · When pollen or pollution is bad, keep windows closed and use an air conditioner or go to places with air conditioning.  Get help right away if:  · You are getting worse.  · You have trouble breathing. If severe, call your local emergency services (911 in the [place]  · You develop chest pain or discomfort.  · You are vomiting.  · You are not able to keep fluids down.  · You are coughing up yellow, green, brown, or bloody sputum.  · You have a fever and your symptoms suddenly get worse.  · You have trouble swallowing.  This information is not intended to replace advice given to you by your health care provider. Make sure you discuss any questions you have with your health care provider.  Document Released: 04/03/2008 Document Revised: 05/31/2017 Document Reviewed: 06/25/2014  [employer] Patient Education © 2017 [employer]

## 2018-08-08 NOTE — DISCHARGE SUMMARY
Discharge Summary    CHIEF COMPLAINT ON ADMISSION  Chief Complaint   Patient presents with   • T-5000 GLF     Pt reports he fell this morning at home. Denies LOC. States he tripped.    • Fever     Pt reports having a 103f fever at home, EMS reports 100f   • Shortness of Breath     Pt has felt SOB upon exertion for the past 2 weeks       Reason for Admission  EMS     Admission Date  8/7/2018    CODE STATUS  Full Code    HPI & HOSPITAL COURSE  This is a 68 y.o. male here with acute respiratory failure with hypoxia due to reactive airway disease.  Please see H&P for details regarding initial hospital presentation.  Patient was started on O2 supplementation and inhaler mgmt prn per RT protocol.  Initial imaging including CXR and CT-chest was negative for PE or any other acute consolidations suggestive of bacterial pneumonia.  In addition to tx above, patient was also started on scheduled PO Prednisone to complete a 5d course as well as Mucinex and Doxycycline.  Patient was also prescribed Claritin to assist with allergy mgmt.  Patient was successfully weaned to room air at rest and with ambulation with improvement of coughing.  No other SIRS marker abnormalities noted since the initiation of treatment.  Therefore no further inpatient observation necessary and stable for discharge home.        Therefore, he is discharged in fair and stable condition to home with close outpatient follow-up.    The patient recovered much more quickly than anticipated on admission.    Discharge Date  8/8/2018    FOLLOW UP ITEMS POST DISCHARGE  NONE    DISCHARGE DIAGNOSES  Principal Problem:    Acute respiratory failure with hypoxia (HCC) POA: Yes  Active Problems:    Reactive airway disease POA: Yes    Chronic atrial fibrillation (HCC) POA: Yes    Essential hypertension POA: Yes  Resolved Problems:    * No resolved hospital problems. *      FOLLOW UP  Future Appointments  Date Time Provider Department Center   8/29/2018 6:45 AM PRE ADMIT  TEST 1 [facility] None     No follow-up provider specified.    MEDICATIONS ON DISCHARGE     Medication List      START taking these medications      Instructions   albuterol 108 (90 Base) MCG/ACT Aers inhalation aerosol   Inhale 2 Puffs by mouth every four hours as needed for Shortness of Breath.  Dose:  2 Puff     doxycycline 100 MG capsule  Commonly known as:  MONODOX   Take 1 Cap by mouth 2 times a day for 5 days.  Dose:  100 mg     guaiFENesin  MG Tb12  Commonly known as:  MUCINEX   Take 1 Tab by mouth every 12 hours for 5 days.  Dose:  600 mg     loratadine 10 MG Tabs  Commonly known as:  CLARITIN   Take 1 Tab by mouth every day for 10 days.  Dose:  10 mg     predniSONE 20 MG Tabs  Commonly known as:  DELTASONE   Take 2 Tabs by mouth every day for 5 days.  Dose:  40 mg        CONTINUE taking these medications      Instructions   acetaminophen 500 MG Tabs  Commonly known as:  TYLENOL   Take 500 mg by mouth 4 times a day as needed.  Dose:  500 mg     carvedilol 12.5 MG Tabs  Commonly known as:  COREG   Take 12.5 mg by mouth 2 Times a Day.  Dose:  12.5 mg     ELIQUIS 5mg Tabs  Generic drug:  apixaban   Take 5 mg by mouth 2 Times a Day.  Dose:  5 mg     lisinopril 5 MG Tabs  Commonly known as:  PRINIVIL   Take 2.5 mg by mouth every morning.  Dose:  2.5 mg     sennosides 8.6 MG Tabs  Commonly known as:  SENOKOT   Take 8.6 mg by mouth 1 time daily as needed.  Dose:  8.6 mg     simvastatin 40 MG Tabs  Commonly known as:  ZOCOR   Take 40 mg by mouth every evening.  Dose:  40 mg     zolpidem 5 MG Tabs  Commonly known as:  AMBIEN   Take 5 mg by mouth at bedtime as needed for Sleep.  Dose:  5 mg        STOP taking these medications    amoxicillin 500 MG Caps  Commonly known as:  AMOXIL            Allergies  No Known Allergies    DIET  Orders Placed This Encounter   Procedures   • Diet Order 2 Gram Sodium     Standing Status:   Standing     Number of Occurrences:   1     Order Specific Question:   Diet:     Answer:   2  Gram Sodium [7]       ACTIVITY  As tolerated.  Weight bearing as tolerated    CONSULTATIONS  NONE    PROCEDURES  NONE    LABORATORY  Lab Results   Component Value Date    SODIUM 133 (L) 08/07/2018    POTASSIUM 4.0 08/07/2018    CHLORIDE 100 08/07/2018    CO2 25 08/07/2018    GLUCOSE 128 (H) 08/07/2018    BUN 14 08/07/2018    CREATININE 1.09 08/07/2018    CREATININE 1.1 12/05/2005        Lab Results   Component Value Date    WBC 9.5 08/07/2018    HEMOGLOBIN 12.4 (L) 08/07/2018    HEMATOCRIT 37.8 (L) 08/07/2018    PLATELETCT 150 (L) 08/07/2018        Total time of the discharge process exceeds 44 minutes.

## 2018-08-08 NOTE — PROGRESS NOTES
Patient alert and oriented x4. Saturated linens with sweat this evening, new linens provided. Temperature of 98.8 F. Ambulated to the restroom to void with standby assistance. Updated on POC, no questions at this time. Tolerated evening medications. Refusing use of bed alarm. Educated on calling for assistance as needed. Call light in reach, rounding in place.

## 2018-08-08 NOTE — CARE PLAN
Problem: Oxygenation/Respiratory Function  Goal: Patient will Achieve/Maintain Normal Respiratory Rate/Effort    Intervention: Assess/Monitor Rate/Rhythm/Depth of effort of respirations   in use.

## 2018-08-08 NOTE — CARE PLAN
Problem: Safety  Goal: Free from accidental injury  Patient is free from accidental injury.  Patient is alert and oriented.  Sustained GLF prior to admit after loss of consciousness.  Fall precautions in place. Patient refuses bed alarms at this time.    Problem: Risk for Impaired Mobility  Goal: Mobilize and/or Transfer Safely with [facility] [place]  Patient educated to call for assistance.  Patient able to transfer himself to edge of bed.  Requires 1 to 2 person assistance with ambulation to bathroom.  Prefers to use urinal at this time.

## 2018-08-08 NOTE — CARE PLAN
Problem: Discharge Barriers/Planning  Goal: Patient's Continuum of care needs are met  Outcome: PROGRESSING AS EXPECTED  PT/OT ordered, Patient able to ambulate to the restroom with standby assistance of RN this evening.    Problem: Pain  Goal: Alleviation of Pain or a reduction in pain to the patient's comfort goal  Outcome: PROGRESSING AS EXPECTED  Patient reporting right hip pain at 4/10. Declines pain intervention at this time. Pain reassessments with rounding.

## 2018-08-12 LAB
BACTERIA BLD CULT: NORMAL
BACTERIA BLD CULT: NORMAL
SIGNIFICANT IND 70042: NORMAL
SIGNIFICANT IND 70042: NORMAL
SITE SITE: NORMAL
SITE SITE: NORMAL
SOURCE SOURCE: NORMAL
SOURCE SOURCE: NORMAL

## 2018-10-15 PROBLEM — B99.9 INFECTION: Status: ACTIVE | Noted: 2018-10-15

## 2021-01-15 DIAGNOSIS — Z23 NEED FOR VACCINATION: ICD-10-CM

## 2022-07-26 NOTE — ED NOTES
Cardiology Follow up    MIGDALIA FITZPATRICK   51y Male  PAST MEDICAL & SURGICAL HISTORY:  Hypertension, unspecified type      High cholesterol      Coronary artery disease      History of appendectomy      History of quadruple bypass           HPI:  Pt is a 52 y/o male with PMH of CAD s/p PCI and CABG (follows with Dr. Ruiz), HTN, HLD and depression presenting for chest pain since the AM. Pt says he was at home setting up an electric bike when he felt midsternal chest pain that radiates to bilateral scapula. Worsens with exertion. Associated with mild SOB. It felt like his previous heart attack and not like acid reflux which he suffers one. Took 3 baby ASAs this morning with no relief. With minimal improvement in symptoms he presented to the ED. Pt reports No fever, cough, nausea/vomiting or lightheadedness.     In the ED: SBP > 180, pain radiating to between the scapula, pt SOB. trops were (-) x1 and ekg was nsr. When seen by medical team, SBP between both arms were 20mmhg on multiple repeats (160/90 on LUE, 180/100 on RUE). CT chest w/ con dissection protocol was ordered and is pending. Pt admitted with placement based on ct results.  (24 Jul 2022 17:55)    Allergies    No Known Allergies    Intolerances      Patient seen and examined at bedside. No acute events overnight.  Patient without complaints. Pt ambulated without issues/symptoms  Denies CP, SOB, palpitations, or dizziness  No events on telemetry overnight    Vital Signs Last 24 Hrs  T(C): 36.2 (26 Jul 2022 04:30), Max: 36.2 (26 Jul 2022 00:42)  T(F): 97.1 (26 Jul 2022 04:30), Max: 97.1 (26 Jul 2022 00:42)  HR: 68 (26 Jul 2022 04:30) (65 - 68)  BP: 130/67 (26 Jul 2022 04:30) (122/67 - 144/78)  BP(mean): --  RR: 18 (26 Jul 2022 04:30) (18 - 18)  SpO2: --        MEDICATIONS  (STANDING):  aspirin  chewable 81 milliGRAM(s) Oral daily  atorvastatin 80 milliGRAM(s) Oral at bedtime  clopidogrel Tablet 75 milliGRAM(s) Oral daily  escitalopram 20 milliGRAM(s) Oral daily  fenofibrate Tablet 145 milliGRAM(s) Oral daily  heparin   Injectable 6500 Unit(s) SubCutaneous every 8 hours  lisinopril 5 milliGRAM(s) Oral daily  metoprolol tartrate 25 milliGRAM(s) Oral two times a day  pantoprazole    Tablet 40 milliGRAM(s) Oral before breakfast  sodium chloride 0.9%. 1000 milliLiter(s) (100 mL/Hr) IV Continuous <Continuous>    MEDICATIONS  (PRN):  acetaminophen     Tablet .. 650 milliGRAM(s) Oral every 6 hours PRN Temp greater or equal to 38C (100.4F), Mild Pain (1 - 3)  labetalol Injectable 10 milliGRAM(s) IV Push once PRN SBP > 160  melatonin 3 milliGRAM(s) Oral at bedtime PRN Insomnia  morphine  - Injectable 2 milliGRAM(s) IV Push every 6 hours PRN Moderate Pain (4 - 6)      REVIEW OF SYSTEMS:          All negative except as mentioned in HPI    PHYSICAL EXAM:           CONSTITUTIONAL: Well-developed; well-nourished; in no acute distress  	SKIN: warm, dry  	HEAD: Normocephalic; atraumatic  	EYES: PERRL.  	ENT: No nasal discharge, airway clear, mucous membranes moist  	NECK: Supple; non tender.  	CARD: +S1, +S2, no murmurs, gallops, or rubs. Regular rate and rhythm    	RESP: No wheezes, rales or rhonchi. CTA B/L  	ABD: soft ntnd, + BS x 4 quadrants  	EXT: moves all extremities,  no clubbing, cyanosis or edema  	NEURO: Alert and oriented x3, no focal deficits          PSYCH: Cooperative, appropriate          VASCULAR:  + Rad / + PTs / + DPs          EXTREMITY:             Right Groin:  pressure dressing removed, access site soft, no hematoma, no pain, + pulses, no sign of infection, no numbness  	            ECG: < from: 12 Lead ECG (07.25.22 @ 04:27) >    Ventricular Rate 64 BPM    Atrial Rate 64 BPM    P-R Interval 162 ms    QRS Duration 88 ms    Q-T Interval 406 ms    QTC Calculation(Bazett) 418 ms    P Axis 40 degrees    R Axis 62 degrees    T Axis 35 degrees    Diagnosis Line Normal sinus rhythm  Normal ECG                                                                                                                  2D ECHO: < from: TTE Echo Complete w/o Contrast w/ Doppler (07.25.22 @ 08:08) >    Summary:   1. Left ventricular ejection fraction, by visual estimation, is 55 to   60%.   2. Normal global left ventricular systolic function.   3. Basal and mid inferior septum and basal anteroseptal segment are   abnormal as described above.   4. Mild tricuspid regurgitation.      LABS:                        13.9   7.43  )-----------( 195      ( 26 Jul 2022 07:14 )             39.9     07-26    141  |  106  |  14  ----------------------------<  101<H>  4.3   |  23  |  0.8    Ca    9.0      26 Jul 2022 07:14  Mg     1.8     07-24    TPro  6.6  /  Alb  4.4  /  TBili  0.3  /  DBili  x   /  AST  26  /  ALT  55<H>  /  AlkPhos  76  07-24    CARDIAC MARKERS ( 26 Jul 2022 01:15 )  x     / 0.58 ng/mL / x     / x     / x      CARDIAC MARKERS ( 25 Jul 2022 22:21 )  x     / 0.53 ng/mL / x     / x     / x      CARDIAC MARKERS ( 25 Jul 2022 17:21 )  x     / 0.52 ng/mL / x     / x     / x      CARDIAC MARKERS ( 25 Jul 2022 00:45 )  x     / 0.21 ng/mL / x     / x     / x      CARDIAC MARKERS ( 24 Jul 2022 21:48 )  x     / 0.14 ng/mL / x     / x     / x      CARDIAC MARKERS ( 24 Jul 2022 15:36 )  x     / <0.01 ng/mL / x     / x     / x          LIVER FUNCTIONS - ( 24 Jul 2022 15:36 )  Alb: 4.4 g/dL / Pro: 6.6 g/dL / ALK PHOS: 76 U/L / ALT: 55 U/L / AST: 26 U/L / GGT: x             A/P:  I discussed the case with Cardiologist Dr. Sweeney  and recommend the following:    S/P PCI 7/25:    NSTEMI  Intervention: successful stenting of distal LIMA graft     	     Continue DAPT ( Aspirin 81 mg PO Daily and Plavix 75 mg daily  ),  B-Blocker, Statin Therapy, Lisinopril, Pantoprazole                   Patient given 30 day supply of ( Aspirin 81 mg daily and Plavix 75 mg daily ) to take at home                   OOB Ambulate                    Monitor access site/ distal pulses                   Patient agreeing to take DAPT for at least one year or as directed by cardiologist                    Pt given instructions on importance of taking antiplatelet medication or risk acute stent thrombosis/death                   Post cath instructions, access site care and activity restrictions reviewed with patient                     Discussed with patient to return to hospital if experience chest pain, shortness breath, dizziness and site bleeding                   Aggressive risk factor modification, diet counseling, smoking cessation discussed with patient                    Benefits of cardiac rehab discussed with patient and all documents sent to cardiac rehab center                   Patient instructed to call cardiac rehab and make initial appointment after first follow-up visit with Cardiologist                    Can discharge patient from cardiac standpoint after ambulating without symptoms, access site wnl, labs and ECG reviewed                    Follow up with Cardiology Dr. Sweeney  in two weeks.  Instructed to call and make an appointment                                       MD at bedside.

## 2023-06-21 ENCOUNTER — HOSPITAL ENCOUNTER (OUTPATIENT)
Dept: RADIOLOGY | Facility: MEDICAL CENTER | Age: 73
End: 2023-06-21
Payer: MEDICARE

## 2023-06-30 ENCOUNTER — HOSPITAL ENCOUNTER (OUTPATIENT)
Dept: RADIOLOGY | Facility: MEDICAL CENTER | Age: 73
End: 2023-06-30
Attending: INTERNAL MEDICINE
Payer: MEDICARE

## 2023-06-30 DIAGNOSIS — Z79.01 LONG TERM (CURRENT) USE OF ANTICOAGULANTS: ICD-10-CM

## 2023-06-30 DIAGNOSIS — C83.85: ICD-10-CM

## 2023-06-30 LAB — PATHOLOGY CONSULT NOTE: NORMAL

## 2023-06-30 PROCEDURE — 88341 IMHCHEM/IMCYTCHM EA ADD ANTB: CPT | Mod: 91

## 2023-06-30 PROCEDURE — 88184 FLOWCYTOMETRY/ TC 1 MARKER: CPT

## 2023-06-30 PROCEDURE — 88305 TISSUE EXAM BY PATHOLOGIST: CPT

## 2023-06-30 PROCEDURE — 88342 IMHCHEM/IMCYTCHM 1ST ANTB: CPT

## 2023-06-30 PROCEDURE — 88342 IMHCHEM/IMCYTCHM 1ST ANTB: CPT | Mod: XU

## 2023-06-30 PROCEDURE — 88185 FLOWCYTOMETRY/TC ADD-ON: CPT | Mod: 91,XU

## 2023-11-29 ENCOUNTER — PATIENT MESSAGE (OUTPATIENT)
Dept: HEALTH INFORMATION MANAGEMENT | Facility: OTHER | Age: 73
End: 2023-11-29

## 2024-06-10 ENCOUNTER — HOSPITAL ENCOUNTER (OUTPATIENT)
Dept: RADIOLOGY | Facility: MEDICAL CENTER | Age: 74
End: 2024-06-10
Payer: MEDICARE